# Patient Record
Sex: FEMALE | Race: WHITE | NOT HISPANIC OR LATINO
[De-identification: names, ages, dates, MRNs, and addresses within clinical notes are randomized per-mention and may not be internally consistent; named-entity substitution may affect disease eponyms.]

---

## 2022-07-15 PROBLEM — Z00.00 ENCOUNTER FOR PREVENTIVE HEALTH EXAMINATION: Status: ACTIVE | Noted: 2022-07-15

## 2022-07-22 ENCOUNTER — APPOINTMENT (OUTPATIENT)
Dept: CARDIOLOGY | Facility: CLINIC | Age: 62
End: 2022-07-22

## 2022-07-22 VITALS
HEART RATE: 97 BPM | OXYGEN SATURATION: 97 % | HEIGHT: 55 IN | SYSTOLIC BLOOD PRESSURE: 134 MMHG | DIASTOLIC BLOOD PRESSURE: 75 MMHG | WEIGHT: 104 LBS | BODY MASS INDEX: 24.07 KG/M2

## 2022-07-22 DIAGNOSIS — Q79.60 EHLERS-DANLOS SYNDROME, UNSP: ICD-10-CM

## 2022-07-22 DIAGNOSIS — Z86.39 PERSONAL HISTORY OF OTHER ENDOCRINE, NUTRITIONAL AND METABOLIC DISEASE: ICD-10-CM

## 2022-07-22 DIAGNOSIS — G43.909 MIGRAINE, UNSPECIFIED, NOT INTRACTABLE, W/OUT STATUS MIGRAINOSUS: ICD-10-CM

## 2022-07-22 DIAGNOSIS — Z87.440 PERSONAL HISTORY OF URINARY (TRACT) INFECTIONS: ICD-10-CM

## 2022-07-22 DIAGNOSIS — Z86.79 PERSONAL HISTORY OF OTHER DISEASES OF THE CIRCULATORY SYSTEM: ICD-10-CM

## 2022-07-22 DIAGNOSIS — Z82.49 FAMILY HISTORY OF ISCHEMIC HEART DISEASE AND OTHER DISEASES OF THE CIRCULATORY SYSTEM: ICD-10-CM

## 2022-07-22 DIAGNOSIS — F17.200 NICOTINE DEPENDENCE, UNSPECIFIED, UNCOMPLICATED: ICD-10-CM

## 2022-07-22 PROCEDURE — 99204 OFFICE O/P NEW MOD 45 MIN: CPT | Mod: 25

## 2022-07-22 PROCEDURE — 93000 ELECTROCARDIOGRAM COMPLETE: CPT

## 2022-07-23 ENCOUNTER — NON-APPOINTMENT (OUTPATIENT)
Age: 62
End: 2022-07-23

## 2022-07-23 PROBLEM — G43.909 MIGRAINE: Status: RESOLVED | Noted: 2022-07-23 | Resolved: 2022-07-23

## 2022-07-23 PROBLEM — Z82.49 FAMILY HISTORY OF ACUTE MYOCARDIAL INFARCTION: Status: ACTIVE | Noted: 2022-07-23

## 2022-07-23 PROBLEM — F17.200 CURRENT SMOKER: Status: ACTIVE | Noted: 2022-07-23

## 2022-07-23 PROBLEM — Z87.440 HISTORY OF URINARY TRACT INFECTION: Status: RESOLVED | Noted: 2022-07-23 | Resolved: 2022-07-23

## 2022-07-23 PROBLEM — Q79.60 EDS (EHLERS-DANLOS SYNDROME): Status: RESOLVED | Noted: 2022-07-23 | Resolved: 2022-07-23

## 2022-07-23 PROBLEM — Z86.79 HISTORY OF HYPERTENSION: Status: RESOLVED | Noted: 2022-07-23 | Resolved: 2022-07-23

## 2022-07-23 PROBLEM — Z86.39 HISTORY OF HYPERLIPIDEMIA: Status: RESOLVED | Noted: 2022-07-23 | Resolved: 2022-07-23

## 2022-07-23 RX ORDER — UBIDECARENONE/VIT E ACET 100MG-5
CAPSULE ORAL
Refills: 0 | Status: ACTIVE | COMMUNITY

## 2022-07-23 RX ORDER — UBIDECARENONE 50 MG
CAPSULE ORAL
Refills: 0 | Status: ACTIVE | COMMUNITY

## 2022-07-23 RX ORDER — OXYCODONE HYDROCHLORIDE AND ACETAMINOPHEN 10; 325 MG/1; MG/1
TABLET ORAL
Refills: 0 | Status: ACTIVE | COMMUNITY

## 2022-07-23 RX ORDER — B-COMPLEX WITH VITAMIN C
TABLET ORAL
Refills: 0 | Status: ACTIVE | COMMUNITY

## 2022-07-23 RX ORDER — BIOTIN 10 MG
TABLET ORAL
Refills: 0 | Status: ACTIVE | COMMUNITY

## 2022-07-23 RX ORDER — DIAZEPAM 5 MG/1
TABLET ORAL
Refills: 0 | Status: ACTIVE | COMMUNITY

## 2022-07-23 NOTE — PHYSICAL EXAM
[Normal Conjunctiva] : normal conjunctiva [Normal S1, S2] : normal S1, S2 [Clear Lung Fields] : clear lung fields [Soft] : abdomen soft [Normal Gait] : normal gait [No Edema] : no edema [No Rash] : no rash [No Focal Deficits] : no focal deficits [de-identified] : Appears in no distress lying flat  [de-identified] : normocephalic [de-identified] : no neck vein distention. No carotid bruit. [de-identified] : no murmur. No gallop heard no diastolic sounds. [de-identified] : decreased range of motion left shoulder [de-identified] : Feet warm and well-perfused. No ulcerations. Dorsalis pedis pulses +2 bilaterally [de-identified] : pleasant

## 2022-07-23 NOTE — DISCUSSION/SUMMARY
[FreeTextEntry1] : Abnormal electrocardiogram\par The 7/22/22 a chart and reveals sinus rhythm with diffuse T-wave inversions in the anterolateral leads. The working diagnosis is atherosclerotic heart disease. The patient has multiple risk factors for the development of ischemic heart disease. A noninvasive cardiac evaluation would be helpful for management decisions. Due to orthopedic issues at this time Jayla is not a candidate for a treadmill study. \par \par I have recommended the following\par a. Risk factor modification\par b. Electrocardiograms  taken prior to 7/22 not available at this time  are  requested for review \par c. Echocardiogram\par d. Lexiscan sesta mibi study\par \par \par \par \par Chest pain\par The working diagnosis is musculoskeletal chest pain. The history is compelling for this diagnosis. The differential diagnosis would include myocardial ischemia/atherosclerotic heart disease. The patient has multiple risk factors for the element of ischemic heart disease and the resting 7/22 electrocardiogram is abnormal.\par \par I have recommended the following\par a. Echocardiogram\par b. Lexiscan sestamibi study.\par \par \par \par Hyperlipidemia\par Hyperlipidemia represents a risk factor for atherosclerotic heart disease. The target LDL level for primary prevention is about 100. Prior administration of HMG-CoA reductase inhibitor therapy has reportedly been not tolerated. In 7/22 the serum cholesterol level was 282  HDL 51 and triglycerides 279. Recent data have indicated the benefit of PCSK9 inhibition therapy  (repatha/praluent) in patients with ischemic heart disease in whom statin therapy was either ineffective or not tolerated. \par Nonpharmacological therapy, specifically diet and exercise are  emphasized as major aspects of treatment\par \par I have recommended the following\par a. Low salt low fat low cholesterol diet. Regular aerobic exercise\par b. Await noninvasive cardiac studies ( see abnormal electrocardiogram entry )\par c. PCSK9 inhibition therapy (repatha/praluent) dependent on the results of the above studies and the patient's clinical course\par \par \par \par \par Hypertension\par Jayla carries a diagnosis of hypertension. Presently the blood pressure level is normal in the absence of any antihypertensive medical therapy.\par The recent guidelines from the  American Heart Association/American College of cardiology have lowered  the  threshold for initiation or intensification of medical intervention for treatment of hypertension\par \par \par I have recommended the following\par a. Low-salt low-fat low-cholesterol diet. Regular aerobic exercise\par b. Home blood pressure monitoring\par c. Antihypertensive medical intervention dependent on the patient's clinical course and hemodynamics.\par \par \par \par Nicotine dependence\par Smoking cigarettes exacerbates Jayla's cardiovascular issues. Complete cessation of smoking is advised.\par \par \par \par \par The diagnosis, prognosis, risks, options and alternatives were explained at length to the patient and family. All questions were answered issues discussed included abnormal electrocardiogram hyperlipidemia antihyperlipidemic medical therapy noninvasive cardiac testing nicotine dependence / importance of complete cessation diet and exercise

## 2022-07-23 NOTE — HISTORY OF PRESENT ILLNESS
[FreeTextEntry1] : 61-year-old female\par Cardiology consultation requested because of an abnormal electrocardiogram \par \par Mrs. Elias has no known heart disease. There is no history of myocardial infarction angina or congestive heart failure. Following trauma to the left shoulder Jayla presented to the emergency room on 22. The electrocardiogram revealed sinus rhythm with diffuse inverted T waves in the anterolateral leads. A cardiology consultation was recommended.\par \par Jayla complains of left shoulder/left shoulder blade pain with radiation towards the chest while at rest.\par Jayla denies exertional chest pain, shortness of breath at rest palpitations or syncope.\par \par There is a prior history of hypertension and hyperlipidemia. She has been intolerant of antihyperlipidemic medical therapy. She smokes one pack of cigarettes daily. Her father probably  of a myocardial infarction.\par \par Mrs Hester presents  today for cardiovascular evaluation. She is accompanied by her \par \par \par

## 2022-08-25 ENCOUNTER — APPOINTMENT (OUTPATIENT)
Dept: CARDIOLOGY | Facility: CLINIC | Age: 62
End: 2022-08-25

## 2022-08-25 PROCEDURE — 93306 TTE W/DOPPLER COMPLETE: CPT

## 2022-08-26 ENCOUNTER — NON-APPOINTMENT (OUTPATIENT)
Age: 62
End: 2022-08-26

## 2022-09-02 ENCOUNTER — APPOINTMENT (OUTPATIENT)
Dept: CARDIOLOGY | Facility: CLINIC | Age: 62
End: 2022-09-02

## 2022-09-02 VITALS
SYSTOLIC BLOOD PRESSURE: 134 MMHG | BODY MASS INDEX: 24.07 KG/M2 | HEIGHT: 55 IN | DIASTOLIC BLOOD PRESSURE: 69 MMHG | WEIGHT: 104 LBS | OXYGEN SATURATION: 97 % | HEART RATE: 83 BPM

## 2022-09-02 DIAGNOSIS — R94.31 ABNORMAL ELECTROCARDIOGRAM [ECG] [EKG]: ICD-10-CM

## 2022-09-02 DIAGNOSIS — I10 ESSENTIAL (PRIMARY) HYPERTENSION: ICD-10-CM

## 2022-09-02 DIAGNOSIS — Z87.891 PERSONAL HISTORY OF NICOTINE DEPENDENCE: ICD-10-CM

## 2022-09-02 DIAGNOSIS — E78.5 HYPERLIPIDEMIA, UNSPECIFIED: ICD-10-CM

## 2022-09-02 PROCEDURE — 99214 OFFICE O/P EST MOD 30 MIN: CPT

## 2022-09-03 PROBLEM — R94.31 ABNORMAL EKG: Status: ACTIVE | Noted: 2022-07-22

## 2022-09-03 PROBLEM — E78.5 HYPERLIPIDEMIA: Status: ACTIVE | Noted: 2022-07-23

## 2022-09-03 PROBLEM — I10 HYPERTENSION: Status: ACTIVE | Noted: 2022-07-23

## 2022-09-03 PROBLEM — Z87.891 HISTORY OF NICOTINE DEPENDENCE: Status: RESOLVED | Noted: 2022-07-23 | Resolved: 2022-07-23

## 2022-09-03 RX ORDER — NIACINAMIDE 500 MG
TABLET ORAL
Refills: 0 | Status: ACTIVE | COMMUNITY

## 2022-09-03 RX ORDER — NAPROXEN 500 MG/1
TABLET ORAL
Refills: 0 | Status: ACTIVE | COMMUNITY

## 2022-09-03 RX ORDER — DIAZEPAM 10 MG/1
10 TABLET ORAL
Qty: 60 | Refills: 0 | Status: ACTIVE | COMMUNITY
Start: 2022-08-16

## 2022-09-03 RX ORDER — ASPIRIN 81 MG
81 TABLET, DELAYED RELEASE (ENTERIC COATED) ORAL
Refills: 0 | Status: ACTIVE | COMMUNITY

## 2022-09-03 RX ORDER — ACETAMINOPHEN 325 MG/1
TABLET, FILM COATED ORAL
Refills: 0 | Status: ACTIVE | COMMUNITY

## 2022-09-03 NOTE — HISTORY OF PRESENT ILLNESS
[FreeTextEntry1] : 61-year-old female\par Routine followup\par Jayla denies chest pain, shortness of breath at rest palpitations or syncope. She has declined to undergo a nuclear stress study\par \par Ms. Hester is accompanied today by her

## 2022-09-03 NOTE — REVIEW OF SYSTEMS
[Feeling Fatigued] : feeling fatigued [Joint Pain] : joint pain [Negative] : Heme/Lymph [FreeTextEntry3] : glasses [FreeTextEntry5] : see history of present illness [FreeTextEntry8] : recent uti treated with antibiotics

## 2022-09-03 NOTE — PHYSICAL EXAM
[Normal Conjunctiva] : normal conjunctiva [Normal S1, S2] : normal S1, S2 [Clear Lung Fields] : clear lung fields [Soft] : abdomen soft [Normal Gait] : normal gait [No Edema] : no edema [No Rash] : no rash [No Focal Deficits] : no focal deficits [de-identified] : Appears in no distress lying flat  [de-identified] : normocephalic [de-identified] : no neck vein distention. No carotid bruit. [de-identified] : no murmur. No gallop heard no diastolic sounds. [de-identified] : decreased range of motion left shoulder [de-identified] : Feet warm and well-perfused. No ulcerations. Dorsalis pedis pulses +2 bilaterally [de-identified] : pleasant

## 2022-09-03 NOTE — DISCUSSION/SUMMARY
[FreeTextEntry1] : Abnormal electrocardiogram\par The 7/22/22 electrocardiogram  reveals sinus rhythm with diffuse T-wave inversions in the anterolateral leads. The working diagnosis is atherosclerotic heart disease with normal left ventricular systolic function. An 8/22 echocardiogram showed no evidence of significant valvular pathology and a left ventricular ejection fraction of 60%\par . The patient has multiple risk factors for the development of ischemic heart disease. Due to orthopedic issues up until the present time she has not been able to undergo a treadmill study but feels that she will be better able to do so in 11/22  She has declined to undergo a Lexiscan sesta mibi study . \par \par I have recommended the following\par a. Risk factor modification\par b.  Any electrocardiogram   taken prior to 7/22 not available at this time  is   (again)  requested for review \par c. stress treadmill ecg  study 11/22\par \par \par \par \par \par \par Hyperlipidemia\par Hyperlipidemia represents a risk factor for atherosclerotic heart disease. The target LDL level for primary prevention is about 100. Prior administration of HMG-CoA reductase inhibitor therapy has reportedly not  been  olerated. In 7/22 the serum cholesterol level was 282  HDL 51 and triglycerides 279. Recent data have indicated the benefit of PCSK9 inhibition therapy  (repatha/praluent) in patients with ischemic heart disease in whom statin therapy was either ineffective or not tolerated. \par Nonpharmacological therapy, specifically diet and exercise are  emphasized as major aspects of treatment\par \par I have recommended the following\par a. Low salt low fat low cholesterol diet. Regular aerobic exercise\par b. Await noninvasive cardiac studies ( see abnormal electrocardiogram entry )\par c  Lipid profile ordered for 11/22\par d . PCSK9 inhibition therapy (repatha/praluent) dependent on the results of the above studies and the patient's clinical course\par \par \par \par \par Hypertension\par Jayla carries a diagnosis of hypertension. Presently the blood pressure level is normal in the absence of any antihypertensive medical therapy.\par The recent guidelines from the  American Heart Association/American College of cardiology have lowered  the  threshold for initiation or intensification of medical intervention for treatment of hypertension\par \par \par I have recommended the following\par a. Low-salt low-fat low-cholesterol diet. Regular aerobic exercise\par b. Home blood pressure monitoring\par c. Antihypertensive medical intervention dependent on the patient's clinical course and hemodynamics.\par \par \par \par Nicotine dependence\par Smoking cigarettes exacerbates Jayla's cardiovascular issues. Complete cessation of smoking is advised.\par \par \par \par \par The diagnosis, prognosis, risks, options and alternatives were explained at length to the patient and family. All questions were answered issues discussed included abnormal electrocardiogram hyperlipidemia antihyperlipidemic medical therapy noninvasive cardiac testing nicotine dependence / importance of complete cessation diet and exercise.\par \par \par Counseling and/or coordination of care\par Time was a significant factor for this patient encounter. Total time sent with the patient and family was 30 minutes. Greater than 50% of the time was devoted to counseling and/or coordination of care

## 2022-11-11 ENCOUNTER — APPOINTMENT (OUTPATIENT)
Dept: CARDIOLOGY | Facility: CLINIC | Age: 62
End: 2022-11-11

## 2022-11-14 ENCOUNTER — APPOINTMENT (OUTPATIENT)
Dept: CARDIOLOGY | Facility: CLINIC | Age: 62
End: 2022-11-14